# Patient Record
Sex: MALE | Race: WHITE
[De-identification: names, ages, dates, MRNs, and addresses within clinical notes are randomized per-mention and may not be internally consistent; named-entity substitution may affect disease eponyms.]

---

## 2017-01-16 ENCOUNTER — HOSPITAL ENCOUNTER (OUTPATIENT)
Dept: HOSPITAL 17 - CLAB | Age: 67
End: 2017-01-16
Payer: COMMERCIAL

## 2017-01-16 DIAGNOSIS — E78.5: ICD-10-CM

## 2017-01-16 DIAGNOSIS — I10: Primary | ICD-10-CM

## 2017-01-16 DIAGNOSIS — R73.9: ICD-10-CM

## 2017-01-16 LAB
ALP SERPL-CCNC: 46 U/L (ref 45–117)
ALT SERPL-CCNC: 54 U/L (ref 12–78)
ANION GAP SERPL CALC-SCNC: 9 MEQ/L (ref 5–15)
AST SERPL-CCNC: 32 U/L (ref 15–37)
BILIRUB SERPL-MCNC: 0.7 MG/DL (ref 0.2–1)
BUN SERPL-MCNC: 16 MG/DL (ref 7–18)
CHLORIDE SERPL-SCNC: 101 MEQ/L (ref 98–107)
GFR SERPLBLD BASED ON 1.73 SQ M-ARVRAT: 63 ML/MIN (ref 89–?)
HCO3 BLD-SCNC: 29 MEQ/L (ref 21–32)
HDLC SERPL-MCNC: 35.2 MG/DL (ref 40–60)
HEMOGLOBIN A1A: 1.2 %
HEMOGLOBIN A1B: 1 %
HEMOGLOBIN AO: 85 %
HEMOGLOBIN LA1C: 1.9 %
HEMOGLOBIN P3: 3.8 %
HGB F MFR BLD: 1.3 %
LDLC SERPL-MCNC: 58 MG/DL (ref 0–99)
POTASSIUM SERPL-SCNC: 3.8 MEQ/L (ref 3.5–5.1)
SODIUM SERPL-SCNC: 139 MEQ/L (ref 136–145)

## 2017-01-16 PROCEDURE — 83036 HEMOGLOBIN GLYCOSYLATED A1C: CPT

## 2017-01-16 PROCEDURE — 80061 LIPID PANEL: CPT

## 2017-01-16 PROCEDURE — 36415 COLL VENOUS BLD VENIPUNCTURE: CPT

## 2017-01-16 PROCEDURE — 80053 COMPREHEN METABOLIC PANEL: CPT

## 2017-02-20 ENCOUNTER — HOSPITAL ENCOUNTER (EMERGENCY)
Dept: HOSPITAL 17 - NEPB | Age: 67
Discharge: HOME | End: 2017-02-20
Payer: COMMERCIAL

## 2017-02-20 VITALS
DIASTOLIC BLOOD PRESSURE: 94 MMHG | OXYGEN SATURATION: 96 % | HEART RATE: 90 BPM | TEMPERATURE: 98 F | SYSTOLIC BLOOD PRESSURE: 184 MMHG | RESPIRATION RATE: 16 BRPM

## 2017-02-20 VITALS — WEIGHT: 220.46 LBS | BODY MASS INDEX: 33.41 KG/M2 | HEIGHT: 68 IN

## 2017-02-20 DIAGNOSIS — I10: ICD-10-CM

## 2017-02-20 DIAGNOSIS — J01.90: Primary | ICD-10-CM

## 2017-02-20 PROCEDURE — 99283 EMERGENCY DEPT VISIT LOW MDM: CPT

## 2017-02-20 NOTE — PD
HPI


Chief Complaint:  Cold / Flu Symptoms


Time Seen by Provider:  05:30


Travel History


International Travel<30 days:  No


Contact w/Intl Traveler<30days:  No


Traveled to known affect area:  No





History of Present Illness


HPI


Patient comes in complaining of sinus congestion cough ongoing for 3 days.  

Patient is coughing up yellowish green phlegm as well as blowing it out his 

nose.  Patient when he lays down he feels mucus running down the back of his 

throat making him cough.  Patient denies any fevers, nausea, vomiting, diarrhea

, abdominal pain, shortness of breath, or chest pain.  Patient states he's been 

using over-the-counter saline rinses, drinking green tea, using Tylenol as 

needed for with minimal relief of symptoms.





PFSH


Past Medical History


Cancer:  Yes (SKIN)


Cardiovascular Problems:  Yes (HTN)


Diabetes:  No


Diminished Hearing:  No


Glaucoma:  No


Genitourinary:  No


Hepatitis:  No


Hiatal Hernia:  No


Hypertension:  Yes


Musculoskeletal:  Yes (MUCUS CYST R THUMB)


Neurologic:  No


Psychiatric:  No


Reproductive:  No


Respiratory:  No


Thyroid Disease:  No





Past Surgical History


Abdominal Surgery:  Yes (UMBILICAL HERNIA REPAIR 2011)


AICD:  No


Genitourinary Surgery:  Yes (CIRCUMCISION)


Joint Replacement:  No


Pacemaker:  No


Other Surgery:  Yes





Social History


Alcohol Use:  No


Tobacco Use:  No


Substance Use:  No





Allergies-Medications


(Allergen,Severity, Reaction):  


Coded Allergies:  


     No Known Allergies (Unverified , 2/20/17)


Reported Meds & Prescriptions





Reported Meds & Active Scripts


Active


Amoxicillin 875 Mg Tab 875 Mg PO BID 10 Days


Reported


Tenormin (Atenolol) 50 Mg Tab 50 Mg PO DAILY








Review of Systems


Except as stated in HPI:  all other systems reviewed are Neg





Physical Exam


Narrative


GENERAL: Well-developed, overly nourished, in no acute distress, and non-ill 

appearing.


SKIN: Warm and dry.


HEAD: Atraumatic. Normocephalic. 


EYES: Pupils equal and round. EOMI. No scleral icterus. No injection or 

drainage. 


ENT: No nasal bleeding or discharge.  Mucous membranes pink and moist.  

Tympanic membranes pearly gray bilaterally.  Posterior pharynx nonerythematous 

exudate.  Uvula is midline.  Tenderness left maxillary and frontal sinus 

palpation.


NECK: Trachea midline. No cervical lymphadenopathy. Supple.  No nuclear 

rigidity.


CARDIOVASCULAR: Regular rate and rhythm.  No murmur appreciated.


RESPIRATORY: No accessory muscle use.  No respiratory distress. Clear to 

auscultation. Breath sounds equal bilaterally. 


MUSCULOSKELETAL: No obvious deformities. No clubbing.  No cyanosis.  No edema.  

Full range of motion.


NEUROLOGICAL: Awake and alert. No obvious cranial nerve deficits.  Motor 

grossly within normal limits. Normal speech.


PSYCHIATRIC: Appropriate mood and affect; insight and judgment normal.





Data


Data


Last Documented VS





Vital Signs








  Date Time  Temp Pulse Resp B/P Pulse Ox O2 Delivery O2 Flow Rate FiO2


 


2/20/17 05:15 98.0 90 16 184/94 96   











MDM


Medical Decision Making


Medical Screen Exam Complete:  Yes


Emergency Medical Condition:  Yes


Differential Diagnosis


Allergies, rhinitis, upper respiratory infection, sinusitis, other


Narrative Course


Patient looks great, non-ill appearing. The patient is tolerating fluids and is 

well hydrated. Appears acute sinusitis. No clinical evidence by history or 

evaluation to suspect meningitis and/or sepsis. There was no evidence to 

suggest deep abscess or cavernous sinus involvement. I discussed with the 

patient, diagnosis, plan of care, medications and to follow up with the patient

s primary physician. The patient was instructed to return if the worsens in 

anyway, especially if not tolerating fluids, increased sinus pain or swelling, 

worsening headache, persistent fever, difficulty swallowing or breathing, or as 

needed. The patient agreed with plan.





Patient in no obvious distress upon re-evaluation. Patient was asked if they 

wanted to speak to my attending, which the patient did not wish to do at this 

time.  Any questions/concerns in reference to patient diagnosis/condition 

discussed and clarified prior to patient's discharge. Reinforced sheer 

importance of close follow up with patient's primary physician or primary care 

clinic. Instructed patient to return to ED immediately, if symptoms return/

worsen. Pt showed understanding of above instructions.  Further instructions 

and recommendations were detailed in discharge paperwork.  Pt ambulated without 

difficulty out of ED at discharge.





Diagnosis





 Primary Impression:  


 Sinusitis, acute


 Qualified Code:  J01.90 - Acute sinusitis, recurrence not specified, 

unspecified location


Patient Instructions:  General Instructions, Sinusitis (ED)





***Additional Instructions:


Follow-up with your primary care physician tin 2-3 days for reevaluation.  Take 

all medication as prescribed.  Drink plenty of non-caffeinated and nonalcoholic 

fluids.  Return to the emergency department if symptoms get worse.


***Med/Other Pt SpecificInfo:  Prescription(s) given


Scripts


Amoxicillin 875 Mg [id number] Mg PO BID  10 Days  Ref 0


   Prov:Yaima Lutz MD         2/20/17


Disposition:  01 DISCHARGE HOME


Condition:  Stable








Nick Caicedo Feb 20, 2017 05:34

## 2017-04-18 ENCOUNTER — HOSPITAL ENCOUNTER (OUTPATIENT)
Dept: HOSPITAL 17 - CLAB | Age: 67
End: 2017-04-18
Payer: COMMERCIAL

## 2017-04-18 DIAGNOSIS — E78.5: ICD-10-CM

## 2017-04-18 DIAGNOSIS — I10: Primary | ICD-10-CM

## 2017-04-18 LAB
ALP SERPL-CCNC: 42 U/L (ref 45–117)
ALT SERPL-CCNC: 55 U/L (ref 12–78)
ANION GAP SERPL CALC-SCNC: 8 MEQ/L (ref 5–15)
AST SERPL-CCNC: 29 U/L (ref 15–37)
BILIRUB SERPL-MCNC: 0.8 MG/DL (ref 0.2–1)
BUN SERPL-MCNC: 18 MG/DL (ref 7–18)
CHLORIDE SERPL-SCNC: 102 MEQ/L (ref 98–107)
GFR SERPLBLD BASED ON 1.73 SQ M-ARVRAT: 57 ML/MIN (ref 89–?)
HCO3 BLD-SCNC: 28.7 MEQ/L (ref 21–32)
HDLC SERPL-MCNC: 35.4 MG/DL (ref 40–60)
LDLC SERPL-MCNC: 53 MG/DL (ref 0–99)
POTASSIUM SERPL-SCNC: 3.7 MEQ/L (ref 3.5–5.1)
SODIUM SERPL-SCNC: 139 MEQ/L (ref 136–145)

## 2017-04-18 PROCEDURE — 80053 COMPREHEN METABOLIC PANEL: CPT

## 2017-04-18 PROCEDURE — 36415 COLL VENOUS BLD VENIPUNCTURE: CPT

## 2017-04-18 PROCEDURE — 80061 LIPID PANEL: CPT

## 2017-07-17 ENCOUNTER — HOSPITAL ENCOUNTER (OUTPATIENT)
Dept: HOSPITAL 17 - CLAB | Age: 67
End: 2017-07-17
Payer: COMMERCIAL

## 2017-07-17 DIAGNOSIS — Z13.220: ICD-10-CM

## 2017-07-17 DIAGNOSIS — E78.5: ICD-10-CM

## 2017-07-17 DIAGNOSIS — I10: Primary | ICD-10-CM

## 2017-07-17 DIAGNOSIS — Z12.5: ICD-10-CM

## 2017-07-17 LAB
ALP SERPL-CCNC: 38 U/L (ref 45–117)
ALT SERPL-CCNC: 57 U/L (ref 12–78)
ANION GAP SERPL CALC-SCNC: 7 MEQ/L (ref 5–15)
AST SERPL-CCNC: 29 U/L (ref 15–37)
BILIRUB SERPL-MCNC: 0.8 MG/DL (ref 0.2–1)
BUN SERPL-MCNC: 18 MG/DL (ref 7–18)
CHLORIDE SERPL-SCNC: 100 MEQ/L (ref 98–107)
ERYTHROCYTE [DISTWIDTH] IN BLOOD BY AUTOMATED COUNT: 14.6 % (ref 11.6–17.2)
GFR SERPLBLD BASED ON 1.73 SQ M-ARVRAT: 60 ML/MIN (ref 89–?)
HCO3 BLD-SCNC: 30.6 MEQ/L (ref 21–32)
HCT VFR BLD CALC: 46.4 % (ref 39–51)
HDLC SERPL-MCNC: 30.2 MG/DL (ref 40–60)
MCH RBC QN AUTO: 27.7 PG (ref 27–34)
MCHC RBC AUTO-ENTMCNC: 34.6 % (ref 32–36)
MCV RBC AUTO: 80 FL (ref 80–100)
PLATELET # BLD: 211 TH/MM3 (ref 150–450)
POTASSIUM SERPL-SCNC: 3.6 MEQ/L (ref 3.5–5.1)
RBC # BLD AUTO: 5.8 MIL/MM3 (ref 4.5–5.9)
REVIEW FLAG: (no result)
SODIUM SERPL-SCNC: 138 MEQ/L (ref 136–145)
WBC # BLD AUTO: 6.7 TH/MM3 (ref 4–11)

## 2017-07-17 PROCEDURE — 80053 COMPREHEN METABOLIC PANEL: CPT

## 2017-07-17 PROCEDURE — 84153 ASSAY OF PSA TOTAL: CPT

## 2017-07-17 PROCEDURE — 85027 COMPLETE CBC AUTOMATED: CPT

## 2017-07-17 PROCEDURE — 36415 COLL VENOUS BLD VENIPUNCTURE: CPT

## 2017-07-17 PROCEDURE — 84443 ASSAY THYROID STIM HORMONE: CPT

## 2017-07-17 PROCEDURE — 80061 LIPID PANEL: CPT

## 2017-10-17 ENCOUNTER — HOSPITAL ENCOUNTER (OUTPATIENT)
Dept: HOSPITAL 17 - CLAB | Age: 67
End: 2017-10-17
Payer: COMMERCIAL

## 2017-10-17 DIAGNOSIS — E78.5: ICD-10-CM

## 2017-10-17 DIAGNOSIS — I10: Primary | ICD-10-CM

## 2017-10-17 LAB
ALP SERPL-CCNC: 41 U/L (ref 45–117)
ALT SERPL-CCNC: 61 U/L (ref 12–78)
ANION GAP SERPL CALC-SCNC: 6 MEQ/L (ref 5–15)
AST SERPL-CCNC: 36 U/L (ref 15–37)
BILIRUB SERPL-MCNC: 0.7 MG/DL (ref 0.2–1)
BUN SERPL-MCNC: 19 MG/DL (ref 7–18)
CHLORIDE SERPL-SCNC: 101 MEQ/L (ref 98–107)
GFR SERPLBLD BASED ON 1.73 SQ M-ARVRAT: 62 ML/MIN (ref 89–?)
HCO3 BLD-SCNC: 30 MEQ/L (ref 21–32)
HDLC SERPL-MCNC: 37.1 MG/DL (ref 40–60)
LDLC SERPL-MCNC: 56 MG/DL (ref 0–99)
POTASSIUM SERPL-SCNC: 3.8 MEQ/L (ref 3.5–5.1)
SODIUM SERPL-SCNC: 137 MEQ/L (ref 136–145)

## 2017-10-17 PROCEDURE — 36415 COLL VENOUS BLD VENIPUNCTURE: CPT

## 2017-10-17 PROCEDURE — 80061 LIPID PANEL: CPT

## 2017-10-17 PROCEDURE — 80053 COMPREHEN METABOLIC PANEL: CPT

## 2018-01-25 ENCOUNTER — HOSPITAL ENCOUNTER (OUTPATIENT)
Dept: HOSPITAL 17 - CLAB | Age: 68
End: 2018-01-25
Payer: COMMERCIAL

## 2018-01-25 DIAGNOSIS — K58.9: ICD-10-CM

## 2018-01-25 DIAGNOSIS — I10: Primary | ICD-10-CM

## 2018-01-25 DIAGNOSIS — E66.9: ICD-10-CM

## 2018-01-25 DIAGNOSIS — E78.5: ICD-10-CM

## 2018-01-25 LAB
ALBUMIN SERPL-MCNC: 4.1 GM/DL (ref 3.4–5)
ALP SERPL-CCNC: 45 U/L (ref 45–117)
ALT SERPL-CCNC: 61 U/L (ref 12–78)
AST SERPL-CCNC: 35 U/L (ref 15–37)
BILIRUB SERPL-MCNC: 0.7 MG/DL (ref 0.2–1)
BUN SERPL-MCNC: 16 MG/DL (ref 7–18)
CALCIUM SERPL-MCNC: 9.2 MG/DL (ref 8.5–10.1)
CHLORIDE SERPL-SCNC: 98 MEQ/L (ref 98–107)
CHOLEST SERPL-MCNC: 166 MG/DL (ref 120–200)
CHOLESTEROL/ HDL RATIO: 5.26 RATIO
CREAT SERPL-MCNC: 1.29 MG/DL (ref 0.6–1.3)
GFR SERPLBLD BASED ON 1.73 SQ M-ARVRAT: 56 ML/MIN (ref 89–?)
HCO3 BLD-SCNC: 32.6 MEQ/L (ref 21–32)
HDLC SERPL-MCNC: 31.5 MG/DL (ref 40–60)
LDLC SERPL-MCNC: 60 MG/DL (ref 0–99)
PROT SERPL-MCNC: 7.7 GM/DL (ref 6.4–8.2)
SODIUM SERPL-SCNC: 137 MEQ/L (ref 136–145)
TRIGL SERPL-MCNC: 372 MG/DL (ref 42–150)

## 2018-01-25 PROCEDURE — 36415 COLL VENOUS BLD VENIPUNCTURE: CPT

## 2018-01-25 PROCEDURE — 80053 COMPREHEN METABOLIC PANEL: CPT

## 2018-01-25 PROCEDURE — 80061 LIPID PANEL: CPT
